# Patient Record
Sex: FEMALE | Race: ASIAN | Employment: PART TIME | URBAN - METROPOLITAN AREA
[De-identification: names, ages, dates, MRNs, and addresses within clinical notes are randomized per-mention and may not be internally consistent; named-entity substitution may affect disease eponyms.]

---

## 2024-05-03 NOTE — PROGRESS NOTES
"Janay Vu is a 29 year old woman diagnosed in 2013 after biopsy by Dr. Silverman  --> who grade 2 infiltrating glioma.  I spoke with her mom,  Frida Vu. She was initially diagnosed with bipolar disorder. She has had episodes of psychosis. She has been on lithium in the past, but had complications from this. She is now on lamotrigine. She has had worseing executive function deficits over the last 1 - 1.5 years. She has trouble organizing her apartment. She has good memory and can socialize with people. She can't calculate as well as she could previous for example could not do simple subtraction now. She is scheduled to see new neuropsych at [facility].     MRI at outside hospital ([facility]) done on 6/23/2023. :     Compared to: Outside brain MRI, 06/23/2023 and brain MRI, 01/23/2018     \"Expansile T2 hyperintense signal on the right posteriorly, unchanged from the most recent comparison but slightly increased in size from 2018. This is consistent with progression of the patient's known low-grade glioma.\"    I personally reviewed MRI and compared it to the 2018 imaging and I do not think there is any significant difference.  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 I would like to get new MRI brain without contrast (this lesion is non-enhancing and patient has had allergic reaction to gadolinium in the past). I also referred her to Dr. Dena Delgado in neurosurgical oncology and Dr. Ritesh Carrasquillo in neuro-oncology.     Obi Murray MD       "

## 2024-05-07 ENCOUNTER — TELEMEDICINE (OUTPATIENT)
Dept: NEUROSURGERY | Facility: CLINIC | Age: 29
End: 2024-05-07
Payer: COMMERCIAL

## 2024-05-07 DIAGNOSIS — C71.9 GLIOMA OF BRAIN (MULTI): Primary | ICD-10-CM

## 2024-05-07 PROCEDURE — 99443 PR PHYS/QHP TELEPHONE EVALUATION 21-30 MIN: CPT | Performed by: NEUROLOGICAL SURGERY

## 2024-05-24 ENCOUNTER — HOSPITAL ENCOUNTER (OUTPATIENT)
Dept: RADIOLOGY | Facility: HOSPITAL | Age: 29
Discharge: HOME | End: 2024-05-24
Payer: COMMERCIAL

## 2024-05-24 DIAGNOSIS — C71.9 GLIOMA OF BRAIN (MULTI): ICD-10-CM

## 2024-05-24 PROCEDURE — 70551 MRI BRAIN STEM W/O DYE: CPT | Performed by: RADIOLOGY

## 2024-05-24 PROCEDURE — 70551 MRI BRAIN STEM W/O DYE: CPT

## 2024-06-13 PROBLEM — R44.0 AUDITORY HALLUCINATIONS: Status: ACTIVE | Noted: 2024-06-13

## 2024-06-13 PROBLEM — Z86.59 H/O BIPOLAR DISORDER: Status: ACTIVE | Noted: 2019-02-15

## 2024-06-13 PROBLEM — Z86.39 H/O: HYPOTHYROIDISM: Status: ACTIVE | Noted: 2024-06-13

## 2024-06-13 PROBLEM — J45.909 ASTHMA (HHS-HCC): Status: ACTIVE | Noted: 2024-06-13

## 2024-06-13 PROBLEM — F25.0 SCHIZOAFFECTIVE DISORDER, BIPOLAR TYPE (MULTI): Status: ACTIVE | Noted: 2024-06-13

## 2024-06-13 PROBLEM — C71.9: Status: ACTIVE | Noted: 2024-06-13

## 2024-06-13 PROBLEM — C71.9 MALIGNANT NEOPLASM OF BRAIN (MULTI): Status: ACTIVE | Noted: 2024-06-13

## 2024-06-13 PROBLEM — C71.9 ASTROCYTOMA (MULTI): Status: ACTIVE | Noted: 2024-06-13

## 2024-06-13 PROBLEM — F31.81 BIPOLAR 2 DISORDER (MULTI): Status: ACTIVE | Noted: 2024-06-13

## 2024-06-13 PROBLEM — F29 PSYCHOSIS (MULTI): Status: ACTIVE | Noted: 2024-06-13

## 2024-06-13 PROBLEM — H52.223 REGULAR ASTIGMATISM OF BOTH EYES: Status: ACTIVE | Noted: 2024-01-15

## 2024-06-13 PROBLEM — H52.13 MYOPIA OF BOTH EYES: Status: ACTIVE | Noted: 2024-01-15

## 2024-06-13 PROBLEM — C71.9 LOW GRADE GLIOMA OF BRAIN (MULTI): Status: ACTIVE | Noted: 2019-02-15

## 2024-06-13 PROBLEM — Z97.3 WEARS CONTACT LENSES: Status: ACTIVE | Noted: 2024-01-15

## 2024-06-13 RX ORDER — CLINDAMYCIN HYDROCHLORIDE 150 MG/1
CAPSULE ORAL
COMMUNITY
Start: 2024-01-26

## 2024-06-13 RX ORDER — PALIPERIDONE PALMITATE 156 MG/ML
INJECTION INTRAMUSCULAR
COMMUNITY

## 2024-06-13 RX ORDER — AZITHROMYCIN 250 MG/1
TABLET, FILM COATED ORAL
COMMUNITY
Start: 2024-01-26

## 2024-06-13 RX ORDER — CLONAZEPAM 0.5 MG/1
0.5 TABLET ORAL 3 TIMES DAILY
COMMUNITY
Start: 2023-08-16

## 2024-06-13 RX ORDER — ALBUTEROL SULFATE 0.83 MG/ML
2.5 SOLUTION RESPIRATORY (INHALATION) EVERY 6 HOURS PRN
COMMUNITY

## 2024-06-13 RX ORDER — FLUOXETINE 20 MG/1
20 TABLET ORAL
COMMUNITY

## 2024-06-13 RX ORDER — OLANZAPINE 15 MG/1
15 TABLET ORAL DAILY
COMMUNITY
Start: 2023-08-16

## 2024-06-13 RX ORDER — QUETIAPINE FUMARATE 200 MG/1
TABLET, FILM COATED ORAL
COMMUNITY
Start: 2023-10-06

## 2024-06-13 RX ORDER — LORAZEPAM 1 MG/1
1 TABLET ORAL EVERY 6 HOURS PRN
COMMUNITY

## 2024-06-13 RX ORDER — CHLORHEXIDINE GLUCONATE ORAL RINSE 1.2 MG/ML
SOLUTION DENTAL
COMMUNITY
Start: 2024-01-26

## 2024-06-13 RX ORDER — MOMETASONE FUROATE 1 MG/G
OINTMENT TOPICAL
COMMUNITY

## 2024-06-13 RX ORDER — ALPRAZOLAM 0.5 MG/1
TABLET ORAL
COMMUNITY
Start: 2023-09-21

## 2024-06-13 RX ORDER — LIOTHYRONINE SODIUM 5 UG/1
5 TABLET ORAL
COMMUNITY

## 2024-06-13 RX ORDER — LEVOTHYROXINE SODIUM 25 UG/1
25 TABLET ORAL
COMMUNITY

## 2024-06-13 RX ORDER — BUDESONIDE AND FORMOTEROL FUMARATE DIHYDRATE 160; 4.5 UG/1; UG/1
2 AEROSOL RESPIRATORY (INHALATION) 2 TIMES DAILY
COMMUNITY

## 2024-06-13 RX ORDER — FERROUS SULFATE 100 %
GRANULES (GRAM) MISCELLANEOUS
COMMUNITY

## 2024-06-13 RX ORDER — BUDESONIDE 0.5 MG/2ML
INHALANT ORAL
COMMUNITY
Start: 2024-03-08

## 2024-06-13 RX ORDER — FLUTICASONE PROPIONATE AND SALMETEROL 100; 50 UG/1; UG/1
1 POWDER RESPIRATORY (INHALATION) EVERY 12 HOURS
COMMUNITY

## 2024-06-13 RX ORDER — SELENIUM 50 MCG
250 TABLET ORAL
COMMUNITY

## 2024-06-13 RX ORDER — TRAZODONE HYDROCHLORIDE 100 MG/1
100 TABLET ORAL
COMMUNITY

## 2024-06-13 RX ORDER — PREDNISONE 50 MG/1
TABLET ORAL
COMMUNITY
Start: 2024-03-19

## 2024-06-13 RX ORDER — ADHESIVE BANDAGE
BANDAGE TOPICAL DAILY PRN
COMMUNITY

## 2024-06-13 RX ORDER — LAMOTRIGINE 100 MG/1
100 TABLET ORAL DAILY
COMMUNITY

## 2024-06-13 RX ORDER — RESVERA/CHROM/GR.TEA/EGCG/DIG3 50MG-20MCG
500 CAPSULE ORAL
COMMUNITY

## 2024-06-13 RX ORDER — LITHIUM CARBONATE 450 MG/1
450 TABLET ORAL
COMMUNITY

## 2024-06-13 RX ORDER — CLOTRIMAZOLE AND BETAMETHASONE DIPROPIONATE 10; .5 MG/ML; MG/ML
LOTION TOPICAL
COMMUNITY
Start: 2022-07-19

## 2024-06-13 RX ORDER — AMOXICILLIN AND CLAVULANATE POTASSIUM 250; 125 MG/1; MG/1
2 TABLET, FILM COATED ORAL
COMMUNITY

## 2024-06-13 RX ORDER — IBUPROFEN 100 MG/5ML
2000 SUSPENSION, ORAL (FINAL DOSE FORM) ORAL
COMMUNITY

## 2024-06-13 RX ORDER — ACETAMINOPHEN 325 MG/1
650 TABLET ORAL EVERY 6 HOURS PRN
COMMUNITY

## 2024-06-13 RX ORDER — DIPHENHYDRAMINE HCL 25 MG
CAPSULE ORAL
COMMUNITY
Start: 2024-03-19

## 2024-06-13 RX ORDER — SODIUM FLUORIDE 5 MG/G
PASTE, DENTIFRICE DENTAL
COMMUNITY
Start: 2022-08-25

## 2024-06-13 RX ORDER — ACETAMINOPHEN 500 MG
1 TABLET ORAL
COMMUNITY

## 2024-06-13 RX ORDER — EPINEPHRINE 0.3 MG/.3ML
INJECTION SUBCUTANEOUS
COMMUNITY
Start: 2024-03-11

## 2024-06-13 RX ORDER — IBUPROFEN 600 MG/1
TABLET ORAL
COMMUNITY
Start: 2024-01-26

## 2024-06-21 ENCOUNTER — APPOINTMENT (OUTPATIENT)
Dept: HEMATOLOGY/ONCOLOGY | Facility: HOSPITAL | Age: 29
End: 2024-06-21
Payer: COMMERCIAL

## 2024-07-02 ENCOUNTER — APPOINTMENT (OUTPATIENT)
Dept: NEUROSURGERY | Facility: CLINIC | Age: 29
End: 2024-07-02
Payer: COMMERCIAL